# Patient Record
Sex: FEMALE | Race: WHITE | NOT HISPANIC OR LATINO | Employment: UNEMPLOYED | URBAN - METROPOLITAN AREA
[De-identification: names, ages, dates, MRNs, and addresses within clinical notes are randomized per-mention and may not be internally consistent; named-entity substitution may affect disease eponyms.]

---

## 2017-05-01 ENCOUNTER — ALLSCRIPTS OFFICE VISIT (OUTPATIENT)
Dept: OTHER | Facility: OTHER | Age: 67
End: 2017-05-01

## 2017-05-01 DIAGNOSIS — Z01.419 ENCOUNTER FOR GYNECOLOGICAL EXAMINATION WITHOUT ABNORMAL FINDING: ICD-10-CM

## 2017-05-04 LAB
HPV 18 (HISTORICAL): NOT DETECTED
HPV HIGH RISK 16/18 (HISTORICAL): NOT DETECTED
HPV16 (HISTORICAL): NOT DETECTED
PAP (HISTORICAL): NORMAL

## 2017-05-12 ENCOUNTER — GENERIC CONVERSION - ENCOUNTER (OUTPATIENT)
Dept: OTHER | Facility: OTHER | Age: 67
End: 2017-05-12

## 2018-01-12 ENCOUNTER — ALLSCRIPTS OFFICE VISIT (OUTPATIENT)
Dept: OTHER | Facility: OTHER | Age: 68
End: 2018-01-12

## 2018-01-12 LAB
BACTERIA UR QL AUTO: 0
CLUE CELL (HISTORICAL): 0
HYPHAL YEAST (HISTORICAL): 0
TRICHOMONAS (HISTORICAL): 0
YEAST (HISTORICAL): 0

## 2018-01-13 VITALS
SYSTOLIC BLOOD PRESSURE: 124 MMHG | DIASTOLIC BLOOD PRESSURE: 62 MMHG | BODY MASS INDEX: 21.51 KG/M2 | HEIGHT: 64 IN | WEIGHT: 126 LBS

## 2018-01-15 LAB
A. VAGINALIS BY PCR (HISTORICAL): NOT DETECTED
A.VAGINALIS LOG (CELL/ML) (HISTORICAL): <3.25
BVAB 2 (HISTORICAL): NOT DETECTED
C. ALBICANS, DNA OR PCR (HISTORICAL): NOT DETECTED
CANDIDA GENUS (HISTORICAL): NOT DETECTED
CULT RSLT GENITAL (HISTORICAL): ABNORMAL
GARDNERELLA BY MOL. METHOD (HISTORICAL): <3.25
GARDNERELLA BY MOL. METHOD (HISTORICAL): NOT DETECTED
LACTOBACILLUS (SPECIES) (HISTORICAL): <3.25
LACTOBACILLUS (SPECIES) (HISTORICAL): NOT DETECTED
MEGASPHAERA TYPE 1 (HISTORICAL): NOT DETECTED
TRICHOMONAS (HISTORICAL): NOT DETECTED

## 2018-01-23 VITALS
HEIGHT: 64 IN | DIASTOLIC BLOOD PRESSURE: 70 MMHG | SYSTOLIC BLOOD PRESSURE: 128 MMHG | BODY MASS INDEX: 21.34 KG/M2 | WEIGHT: 125 LBS

## 2018-02-01 ENCOUNTER — TELEPHONE (OUTPATIENT)
Dept: OBGYN CLINIC | Facility: CLINIC | Age: 68
End: 2018-02-01

## 2018-02-02 ENCOUNTER — TELEPHONE (OUTPATIENT)
Dept: OBGYN CLINIC | Facility: CLINIC | Age: 68
End: 2018-02-02

## 2018-02-02 NOTE — TELEPHONE ENCOUNTER
Per Dr Zita Holland, review that it may just be a normal discharge or may be working up a yeast infection  She would like her to try acidophilus or luvena probiotic  Also reviewed desitin if area is irritated from constantly wiping from the excess discharge  Will watch over the weekend and next few days and if no improvement is seen she should certainly give us a call back to possibly come in for another culture

## 2018-02-06 ENCOUNTER — TELEPHONE (OUTPATIENT)
Dept: OBGYN CLINIC | Facility: CLINIC | Age: 68
End: 2018-02-06

## 2018-02-06 NOTE — TELEPHONE ENCOUNTER
Recently treated patient for vaginal discharge which came back as E-Coli  Pt states discharge went away for about 4 days but has since returned  Spoke with patient about taking Acidophilous daily to help the vaginal mitchel and help prevent any vaginal infections  Reviewed [facility] as well to help

## 2018-07-02 ENCOUNTER — ANNUAL EXAM (OUTPATIENT)
Dept: OBGYN CLINIC | Facility: CLINIC | Age: 68
End: 2018-07-02
Payer: MEDICARE

## 2018-07-02 VITALS
BODY MASS INDEX: 20.14 KG/M2 | SYSTOLIC BLOOD PRESSURE: 116 MMHG | DIASTOLIC BLOOD PRESSURE: 66 MMHG | HEIGHT: 64 IN | WEIGHT: 118 LBS

## 2018-07-02 DIAGNOSIS — Z91.89 GYN EXAM FOR HIGH-RISK MEDICARE PATIENT: Primary | ICD-10-CM

## 2018-07-02 DIAGNOSIS — Z12.39 SCREENING FOR MALIGNANT NEOPLASM OF BREAST: ICD-10-CM

## 2018-07-02 RX ORDER — LEVOTHYROXINE SODIUM 0.07 MG/1
TABLET ORAL
COMMUNITY
Start: 2018-04-23 | End: 2021-09-15

## 2018-07-02 RX ORDER — MONTELUKAST SODIUM 10 MG/1
10 TABLET ORAL
COMMUNITY
Start: 2008-02-28

## 2018-07-02 RX ORDER — VENLAFAXINE HYDROCHLORIDE 37.5 MG/1
CAPSULE, EXTENDED RELEASE ORAL
COMMUNITY
Start: 2017-04-06

## 2018-07-02 RX ORDER — DIPHENOXYLATE HYDROCHLORIDE AND ATROPINE SULFATE 2.5; .025 MG/1; MG/1
1 TABLET ORAL
COMMUNITY

## 2018-07-02 NOTE — PROGRESS NOTES
Subjective      Marcos Olmstead is a 78 y o  female  who presents for annual well woman exam  Periods are absent  No bleeding, spotting, or discharge  Patient reports Yes  hot flashes/night sweats, No pain problems intercourse, Yes  vaginal dryness managed well with Premarin and lubrication, sleeping fairly well good nights and bad nights  Current contraception: post menopausal status  History of abnormal Pap smear: yes -4 years ago had abnormal Pap, has been followed  Family history of uterine or ovarian cancer: no  Regular self breast exam: yes  History of abnormal mammogram: no  Family history of breast cancer: yes - andbreast cancer: yes -    Menstrual History:  OB History      Para Term  AB Living    1       1      SAB TAB Ectopic Multiple Live Births                      Menarche age: 16  No LMP recorded (lmp unknown)  The following portions of the patient's history were reviewed and updated as appropriate: allergies, current medications, past family history, past medical history, past social history, past surgical history and problem list   Past Medical History:   Diagnosis Date    Arthritis     Asthma     Herpes genitalis     Hypothyroidism     IBS (irritable bowel syndrome)     Irritable bowel syndrome     Pneumonia      Past Surgical History:   Procedure Laterality Date    CERVICAL CONE BIOPSY      cervical conization by cold knife    CHOLECYSTECTOMY      OVARIAN CYST REMOVAL      SHOULDER SURGERY      TUBAL LIGATION       OB History      Para Term  AB Living    1       1      SAB TAB Ectopic Multiple Live Births                       Review of Systems  Review of Systems   Constitutional: Negative for chills, fatigue, fever and unexpected weight change  HENT: Negative for dental problem, sinus pain and sinus pressure  Eyes: Negative for visual disturbance  Respiratory: Negative for cough, shortness of breath and wheezing      Cardiovascular: Negative for chest pain and leg swelling  Gastrointestinal: Positive for diarrhea  Negative for constipation, nausea and vomiting  Genitourinary: Negative for menstrual problem, pelvic pain and urgency  Musculoskeletal: Negative for back pain  Allergic/Immunologic: Negative for environmental allergies  Neurological: Negative for dizziness and headaches  Objective      LMP  (LMP Unknown)   Vitals:    18 1145   BP: 116/66       General:   alert and oriented, in no acute distress, alert, appears stated age and cooperative   Heart: regular rate and rhythm, S1, S2 normal, no murmur, click, rub or gallop   Lungs: clear to auscultation bilaterally   Abdomen: soft, non-tender, without masses or organomegaly   Vulva: normal   Vagina: normal mucosa   Cervix: no bleeding following Pap, no cervical motion tenderness and no lesions   Uterus: normal size, mobile, non-tender   Adnexa: normal adnexa and no mass, fullness, tenderness   Breast inspection negative, no nipple discharge or bleeding, no masses or nodularity palpable  Rectal negative, stool guaiac negative  Pupils equal round reactive to light and accommodation  Throat clear no lesions or findings  Thyroid normal no masses or nodules            Assessment   81-year-old  here for annual exam had abnormal Pap 4 years ago  Follow-up has been good  Patient sees Dr Maisha Isidro for asthma  Plan   Thin prep with Co testing performed, consider if normal to begin to decrease Paps her regimen  Given slip for mammogram   Return in 1 year or sooner as needed

## 2018-07-06 LAB
HPV HR 12 DNA CVX QL NAA+PROBE: NOT DETECTED
HPV16 DNA SPEC QL NAA+PROBE: NOT DETECTED
HPV18 DNA SPEC QL NAA+PROBE: NOT DETECTED
THIN PREP CVX: NORMAL

## 2019-03-07 ENCOUNTER — TELEPHONE (OUTPATIENT)
Dept: OBGYN CLINIC | Facility: CLINIC | Age: 69
End: 2019-03-07

## 2019-03-07 DIAGNOSIS — B00.9 HERPES: Primary | ICD-10-CM

## 2019-03-07 RX ORDER — ACYCLOVIR 5 %
OINTMENT (GRAM) TOPICAL
Qty: 30 G | Refills: 0 | Status: SHIPPED | OUTPATIENT
Start: 2019-03-07 | End: 2022-04-27

## 2019-07-15 ENCOUNTER — ANNUAL EXAM (OUTPATIENT)
Dept: OBGYN CLINIC | Facility: CLINIC | Age: 69
End: 2019-07-15
Payer: MEDICARE

## 2019-07-15 VITALS
WEIGHT: 125 LBS | HEIGHT: 64 IN | DIASTOLIC BLOOD PRESSURE: 60 MMHG | BODY MASS INDEX: 21.34 KG/M2 | SYSTOLIC BLOOD PRESSURE: 110 MMHG

## 2019-07-15 DIAGNOSIS — Z12.39 SCREENING FOR MALIGNANT NEOPLASM OF BREAST: ICD-10-CM

## 2019-07-15 DIAGNOSIS — N95.2 ATROPHIC VAGINITIS: ICD-10-CM

## 2019-07-15 DIAGNOSIS — Z01.419 ENCOUNTER FOR GYNECOLOGICAL EXAMINATION WITHOUT ABNORMAL FINDING: Primary | ICD-10-CM

## 2019-07-15 PROCEDURE — G0101 CA SCREEN;PELVIC/BREAST EXAM: HCPCS | Performed by: OBSTETRICS & GYNECOLOGY

## 2019-07-15 NOTE — PROGRESS NOTES
Subjective      Melanie Araujo is a 80 y o   female who presents for annual well woman exam  Patient reports no bleeding, spotting, or discharge  Had possible yeast infection that she treated with probiotic and resolved  Patient reports Yes  hot flashes/night sweats, No pain problems intercourse, No vaginal dryness, sleeping fairly well,  sometimes could sometimes not as well  Some vaginal dryness noted discuss Premarin vaginal cream to continue to treat  Current contraception: post menopausal status  History of abnormal Pap smear: yes - cone biopsy in  with ROMULO 2  Family history of uterine or ovarian cancer: no  Regular self breast exam: yes  History of abnormal mammogram: no  Family history of breast cancer: yes     Menstrual History:  OB History        1    Para        Term                AB   1    Living           SAB        TAB        Ectopic        Multiple        Live Births                    Menarche age: 16    LMP 0    The following portions of the patient's history were reviewed and updated as appropriate: allergies, current medications, past family history, past medical history, past social history, past surgical history and problem list   Past Medical History:   Diagnosis Date    Arthritis     Asthma     Herpes genitalis     Hypothyroidism     IBS (irritable bowel syndrome)     Irritable bowel syndrome     Pneumonia      Past Surgical History:   Procedure Laterality Date    CERVICAL CONE BIOPSY      cervical conization by cold knife    CHOLECYSTECTOMY      OVARIAN CYST REMOVAL      SHOULDER SURGERY      TUBAL LIGATION       OB History        1    Para        Term                AB   1    Living           SAB        TAB        Ectopic        Multiple        Live Births                     Review of Systems  Review of Systems   Constitutional: Negative for chills, fatigue, fever and unexpected weight change     HENT: Negative for dental problem, sinus pressure and sinus pain  Eyes: Negative for visual disturbance  Respiratory: Positive for cough  Negative for shortness of breath and wheezing  Cardiovascular: Negative for chest pain and leg swelling  Gastrointestinal: Positive for diarrhea  Negative for constipation, nausea and vomiting  Endocrine: Positive for heat intolerance  Genitourinary: Negative for menstrual problem, pelvic pain and urgency  Musculoskeletal: Positive for arthralgias  Negative for back pain  Allergic/Immunologic: Positive for environmental allergies  Neurological: Positive for headaches  Negative for dizziness  Objective     Vitals:    07/15/19 1423   BP: 110/60   BP Location: Left arm   Patient Position: Sitting   Cuff Size: Standard   Weight: 56 7 kg (125 lb)   Height: 5' 4" (1 626 m)       General:   alert and oriented, in no acute distress   Heart: regular rate and rhythm, S1, S2 normal, no murmur, click, rub or gallop   Lungs: clear to auscultation bilaterally   Abdomen: soft, non-tender, without masses or organomegaly   Vulva: normal   Vagina: normal mucosa   Cervix: no cervical motion tenderness and no lesions   Uterus: normal size, mobile, non-tender   Adnexa: normal adnexa and no mass, fullness, tenderness   Breast inspection negative, no nipple discharge or bleeding, no masses or nodularity palpable  Rectal negative, stool guaiac negative  Pupils equal round reactive to light and accommodation  Throat clear no lesions or findings  Thyroid normal no masses or nodules     Assessment   60-year-old  here for annual exam had history of cold knife cone for cervical dysplasia in        Plan   Prescribed Premarin cream, thin prep with Co testing performed, return in 1 year or sooner as needed, mammogram slip given

## 2019-08-02 DIAGNOSIS — Z12.39 SCREENING FOR MALIGNANT NEOPLASM OF BREAST: ICD-10-CM

## 2020-11-17 ENCOUNTER — OFFICE VISIT (OUTPATIENT)
Dept: OBGYN CLINIC | Facility: CLINIC | Age: 70
End: 2020-11-17
Payer: MEDICARE

## 2020-11-17 VITALS
BODY MASS INDEX: 22.71 KG/M2 | HEIGHT: 64 IN | SYSTOLIC BLOOD PRESSURE: 142 MMHG | TEMPERATURE: 95.8 F | WEIGHT: 133 LBS | DIASTOLIC BLOOD PRESSURE: 76 MMHG

## 2020-11-17 DIAGNOSIS — B96.89 BACTERIAL VAGINOSIS: Primary | ICD-10-CM

## 2020-11-17 DIAGNOSIS — N76.0 BACTERIAL VAGINOSIS: Primary | ICD-10-CM

## 2020-11-17 DIAGNOSIS — N89.8 VAGINAL DISCHARGE: ICD-10-CM

## 2020-11-17 PROBLEM — N95.1 MENOPAUSAL VAGINAL DRYNESS: Status: ACTIVE | Noted: 2017-09-07

## 2020-11-17 PROBLEM — G25.81 RESTLESS LEGS SYNDROME (RLS): Status: ACTIVE | Noted: 2018-10-22

## 2020-11-17 PROBLEM — A49.8 E. COLI INFECTION: Status: ACTIVE | Noted: 2018-01-16

## 2020-11-17 PROBLEM — G43.719 INTRACTABLE CHRONIC MIGRAINE WITHOUT AURA AND WITHOUT STATUS MIGRAINOSUS: Status: ACTIVE | Noted: 2017-04-06

## 2020-11-17 PROBLEM — N95.9 POST MENOPAUSAL PROBLEMS: Status: ACTIVE | Noted: 2017-09-07

## 2020-11-17 LAB
BV WHIFF TEST VAG QL: ABNORMAL
CLUE CELLS SPEC QL WET PREP: ABNORMAL
PH SMN: ABNORMAL [PH]
SL AMB POCT WET MOUNT: ABNORMAL
T VAGINALIS VAG QL WET PREP: ABNORMAL
YEAST VAG QL WET PREP: ABNORMAL

## 2020-11-17 PROCEDURE — 99214 OFFICE O/P EST MOD 30 MIN: CPT | Performed by: PHYSICIAN ASSISTANT

## 2020-11-17 PROCEDURE — 87210 SMEAR WET MOUNT SALINE/INK: CPT | Performed by: PHYSICIAN ASSISTANT

## 2020-11-17 PROCEDURE — 87070 CULTURE OTHR SPECIMN AEROBIC: CPT | Performed by: PHYSICIAN ASSISTANT

## 2020-11-17 RX ORDER — LEVOTHYROXINE SODIUM 88 UG/1
TABLET ORAL
COMMUNITY
Start: 2020-09-21

## 2020-11-17 RX ORDER — METRONIDAZOLE 500 MG/1
500 TABLET ORAL EVERY 12 HOURS SCHEDULED
Qty: 14 TABLET | Refills: 0 | Status: SHIPPED | OUTPATIENT
Start: 2020-11-17 | End: 2020-11-24

## 2020-11-21 LAB — BACTERIA GENITAL AEROBE CULT: NORMAL

## 2020-11-25 ENCOUNTER — TELEPHONE (OUTPATIENT)
Dept: OBGYN CLINIC | Facility: CLINIC | Age: 70
End: 2020-11-25

## 2020-11-25 DIAGNOSIS — B96.89 BACTERIAL VAGINOSIS: Primary | ICD-10-CM

## 2020-11-25 DIAGNOSIS — N76.0 BACTERIAL VAGINOSIS: Primary | ICD-10-CM

## 2020-11-25 RX ORDER — CLINDAMYCIN PHOSPHATE 20 MG/G
1 CREAM VAGINAL
Qty: 40 G | Refills: 0 | Status: SHIPPED | OUTPATIENT
Start: 2020-11-25 | End: 2020-11-28

## 2020-12-07 ENCOUNTER — TELEPHONE (OUTPATIENT)
Dept: OBGYN CLINIC | Facility: CLINIC | Age: 70
End: 2020-12-07

## 2020-12-08 ENCOUNTER — OFFICE VISIT (OUTPATIENT)
Dept: OBGYN CLINIC | Facility: CLINIC | Age: 70
End: 2020-12-08
Payer: MEDICARE

## 2020-12-08 VITALS
BODY MASS INDEX: 22.71 KG/M2 | WEIGHT: 133 LBS | SYSTOLIC BLOOD PRESSURE: 168 MMHG | DIASTOLIC BLOOD PRESSURE: 80 MMHG | HEIGHT: 64 IN

## 2020-12-08 DIAGNOSIS — N89.8 VAGINAL DISCHARGE: Primary | ICD-10-CM

## 2020-12-08 PROBLEM — Z87.01 HISTORY OF PNEUMONIA: Status: ACTIVE | Noted: 2020-12-08

## 2020-12-08 PROBLEM — R05.9 COUGH: Status: ACTIVE | Noted: 2020-12-08

## 2020-12-08 PROBLEM — J45.20 MILD INTERMITTENT ASTHMA: Status: ACTIVE | Noted: 2020-12-08

## 2020-12-08 PROBLEM — J30.81 ALLERGIC RHINITIS DUE TO ANIMAL HAIR AND DANDER: Status: ACTIVE | Noted: 2020-12-08

## 2020-12-08 PROBLEM — J30.9 ALLERGIC RHINITIS: Status: ACTIVE | Noted: 2020-12-08

## 2020-12-08 PROBLEM — J30.1 ALLERGIC RHINITIS DUE TO POLLEN: Status: ACTIVE | Noted: 2020-12-08

## 2020-12-08 LAB
BV WHIFF TEST VAG QL: ABNORMAL
CLUE CELLS SPEC QL WET PREP: ABNORMAL
PH SMN: 7 [PH]
SL AMB POCT WET MOUNT: ABNORMAL
T VAGINALIS VAG QL WET PREP: ABNORMAL
YEAST VAG QL WET PREP: ABNORMAL

## 2020-12-08 PROCEDURE — 87070 CULTURE OTHR SPECIMN AEROBIC: CPT | Performed by: PHYSICIAN ASSISTANT

## 2020-12-08 PROCEDURE — 87210 SMEAR WET MOUNT SALINE/INK: CPT | Performed by: PHYSICIAN ASSISTANT

## 2020-12-08 PROCEDURE — 99213 OFFICE O/P EST LOW 20 MIN: CPT | Performed by: PHYSICIAN ASSISTANT

## 2020-12-08 RX ORDER — MONTELUKAST SODIUM 10 MG/1
TABLET ORAL EVERY 24 HOURS
COMMUNITY
End: 2021-09-15 | Stop reason: SDUPTHER

## 2020-12-11 LAB — BACTERIA GENITAL AEROBE CULT: NORMAL

## 2021-02-13 DIAGNOSIS — Z23 ENCOUNTER FOR IMMUNIZATION: ICD-10-CM

## 2021-02-19 ENCOUNTER — IMMUNIZATIONS (OUTPATIENT)
Dept: FAMILY MEDICINE CLINIC | Facility: HOSPITAL | Age: 71
End: 2021-02-19

## 2021-02-19 DIAGNOSIS — Z23 ENCOUNTER FOR IMMUNIZATION: Primary | ICD-10-CM

## 2021-02-19 PROCEDURE — 91300 SARS-COV-2 / COVID-19 MRNA VACCINE (PFIZER-BIONTECH) 30 MCG: CPT

## 2021-02-19 PROCEDURE — 0001A SARS-COV-2 / COVID-19 MRNA VACCINE (PFIZER-BIONTECH) 30 MCG: CPT

## 2021-03-11 ENCOUNTER — IMMUNIZATIONS (OUTPATIENT)
Dept: FAMILY MEDICINE CLINIC | Facility: HOSPITAL | Age: 71
End: 2021-03-11

## 2021-03-11 DIAGNOSIS — Z23 ENCOUNTER FOR IMMUNIZATION: Primary | ICD-10-CM

## 2021-03-11 PROCEDURE — 0002A SARS-COV-2 / COVID-19 MRNA VACCINE (PFIZER-BIONTECH) 30 MCG: CPT

## 2021-03-11 PROCEDURE — 91300 SARS-COV-2 / COVID-19 MRNA VACCINE (PFIZER-BIONTECH) 30 MCG: CPT

## 2021-03-28 DIAGNOSIS — N95.2 ATROPHIC VAGINITIS: ICD-10-CM

## 2021-04-05 ENCOUNTER — TELEPHONE (OUTPATIENT)
Dept: OBGYN CLINIC | Facility: CLINIC | Age: 71
End: 2021-04-05

## 2021-04-05 NOTE — TELEPHONE ENCOUNTER
Could not complete [employer] PA on CoverMyMeds, [address]  Reference #[id number]  Non-formulary medication  Formulary medication - Estradoil  0 5mg, 1mg or 2mg

## 2021-04-05 NOTE — TELEPHONE ENCOUNTER
CoverMyMeds states pt does not have pharmacy benefits with her [employer]  Called pt, pt states she has pharmacy benefits through [place] [address] ID# [id number], [facility] [id number] and [phone]

## 2021-04-05 NOTE — TELEPHONE ENCOUNTER
Pt requesting us contact her [address] regarding the Premarin cream  Pt states Konstantin told her they reached out to us to discuss the prescription  Pt aware will contact pharmacy and let her know the result

## 2021-04-06 NOTE — TELEPHONE ENCOUNTER
Roberto requesting more information on the Premarin Cream  May call : [phone]  Could you please review and let me know what other indications pt may have for medication? Looks like pt has been on medication since 2018  Was there any failed medications prior? Premarin Cream is non formulary but Yan Mays is on pt formulary for approved medication  Please review

## 2021-04-06 NOTE — TELEPHONE ENCOUNTER
Reviewed with pt  Pt would like us to reach back out to insurance to review the information  Pt aware will contact insurance and keep her updated

## 2021-04-06 NOTE — TELEPHONE ENCOUNTER
Submitted [place] to be [place] [facility]  Requesting pt continue medication as she has been on since 2018  Pt does not wish to change Rx when current medication is working for pt  Will be notified by fax regarding results of decision

## 2021-04-06 NOTE — TELEPHONE ENCOUNTER
Is estrace cream covered, from what I understand she is using for vaginal dryness  I do not want to put her on oral HRT

## 2021-04-08 NOTE — TELEPHONE ENCOUNTER
Reviewed with pt  Pt states she will contact [employer] to discuss with them  She will also look into OOP

## 2021-04-08 NOTE — TELEPHONE ENCOUNTER
Denial letter for Premarin Cream in pts media  Went to [employer] to see other alternatives offered on formulary - https://drug-list-search  apps  [employer]ch[employer]/medicare/check-coverage  Only medication on formulary is Estradiol  Denial letter does state "estradiol vaginal cream, estring vaginal ring or osphena tablet"  Will need to speak to pt and see how she would like to proceed

## 2021-09-15 ENCOUNTER — ANNUAL EXAM (OUTPATIENT)
Dept: OBGYN CLINIC | Facility: CLINIC | Age: 71
End: 2021-09-15
Payer: MEDICARE

## 2021-09-15 ENCOUNTER — TELEPHONE (OUTPATIENT)
Dept: ADMINISTRATIVE | Facility: OTHER | Age: 71
End: 2021-09-15

## 2021-09-15 VITALS
SYSTOLIC BLOOD PRESSURE: 112 MMHG | HEIGHT: 64 IN | DIASTOLIC BLOOD PRESSURE: 66 MMHG | BODY MASS INDEX: 22.4 KG/M2 | WEIGHT: 131.2 LBS

## 2021-09-15 DIAGNOSIS — N87.1 DYSPLASIA OF CERVIX, HIGH GRADE CIN 2: ICD-10-CM

## 2021-09-15 DIAGNOSIS — Z12.31 ENCOUNTER FOR SCREENING MAMMOGRAM FOR BREAST CANCER: ICD-10-CM

## 2021-09-15 DIAGNOSIS — N95.2 ATROPHIC VAGINITIS: ICD-10-CM

## 2021-09-15 DIAGNOSIS — Z01.419 ROUTINE GYNECOLOGICAL EXAMINATION: Primary | ICD-10-CM

## 2021-09-15 PROCEDURE — G0145 SCR C/V CYTO,THINLAYER,RESCR: HCPCS | Performed by: PHYSICIAN ASSISTANT

## 2021-09-15 PROCEDURE — G0101 CA SCREEN;PELVIC/BREAST EXAM: HCPCS | Performed by: PHYSICIAN ASSISTANT

## 2021-09-15 NOTE — TELEPHONE ENCOUNTER
----- Message from Gely Parker PA-C sent at 9/15/2021 10:32 AM EDT -----  09/15/21 10:32 AM     Hello, our patient Rosamaria Sin had a Colonoscopy  completed/performed  Please assist in updating the patient chart by making an External outreach to [facility]        Thank you,   Gely Parker PA-C   [address] [place] [address]

## 2021-09-15 NOTE — TELEPHONE ENCOUNTER
Upon review of the In Basket request and the patient's chart, initial outreach has been made via fax, please see Contacts section for details       Thank you  Twila Mccall MA

## 2021-09-15 NOTE — ASSESSMENT & PLAN NOTE
Pap guidelines reviewed  Pap with reflex done today  Will plan to continue Premarin cream, script refilled  Continue yearly mammograms  Return to office for annual or as needed

## 2021-09-15 NOTE — LETTER
[facility] Medical Records Request for Care Gap Closure    Medical Records Fax: [phone]    Date Requested: 09/15/21  Patient: Elizabeth Worthy  Patient : 1950   Requesting on behalf of Provider: MD Blanco Bhakta MD has requested the retrieval and updating of CRC: Colonoscopy  The office staff has provided us with the following information listed below  Prior to sending this request I have reviewed Epic Chart Review, completed an Epic Chart Search, and reviewed Care Everywhere documents  Estimated Date of Service: ***  Facility: ***  Specialty: ***  Performing Provider: ***  Additional Comments: ***     Your assistance in completing this request is greatly appreciated  Please send document to {VBIRIGHTFAXNUMBERS:73637} attention {VBIQANAMES:02668}       Sincerely,      Rufina Redman, [address] [place] [address] Tamie Barrett

## 2021-09-15 NOTE — PROGRESS NOTES
Assessment/Plan   Problem List Items Addressed This Visit        Other    Routine gynecological examination - Primary     Pap guidelines reviewed  Pap with reflex done today  Will plan to continue Premarin cream, script refilled  Continue yearly mammograms  Return to office for annual or as needed  Relevant Orders    Liquid-based pap, screening      Other Visit Diagnoses     Dysplasia of cervix, high grade ROMULO 2        Relevant Orders    Liquid-based pap, screening    Encounter for screening mammogram for breast cancer        Relevant Orders    Mammo screening bilateral w 3d & cad    Atrophic vaginitis        Relevant Medications    conjugated estrogens (PREMARIN) vaginal cream          Subjective:     Patient ID: Hamilton Schaeffer is a 80 y o  y o  female  HPI  81 yo seen for annual exam  LMP: 1997 No bleeding since  Denies bowel or bladder issues  Using Premarin cream for vaginal dryness, has been helping  Typically uses twice a week  Last pap: 7/15/2019 NILM (-)HRHPV  Cone biopsy in 2014 secondary to ROMULO 2  Last mammogram: 7/31/2019 reports normal    Last colonoscopy: 10/2020, reports due in 5 years  The following portions of the patient's history were reviewed and updated as appropriate:   She  has a past medical history of Abnormal Pap smear of cervix (02/17/2014), Arthritis, Asthma, Herpes genitalis, Hypothyroidism, IBS (irritable bowel syndrome), Irritable bowel syndrome, Migraine, and Pneumonia    She   Patient Active Problem List    Diagnosis Date Noted    Routine gynecological examination 09/15/2021    Allergic rhinitis 12/08/2020    Allergic rhinitis due to animal hair and dander 12/08/2020    Allergic rhinitis due to pollen 12/08/2020    Cough 12/08/2020    History of pneumonia 12/08/2020    Mild intermittent asthma 12/08/2020    Bacterial vaginosis 11/17/2020    Restless legs syndrome (RLS) 10/22/2018    E  coli infection 01/16/2018    Vaginal discharge 01/12/2018    Vaginal itching 2018    Menopausal vaginal dryness 2017    Post menopausal problems 2017    Intractable chronic migraine without aura and without status migrainosus 2017    Gastroesophageal reflux disease without esophagitis 2016    Irritable bowel syndrome with diarrhea 2016    Mild persistent asthma without complication     Other specified hypothyroidism 2016    Senile osteoporosis 2016    Asthma 2016    Neck pain 2008     She  has a past surgical history that includes Cervical cone biopsy; Cholecystectomy; Ovarian cyst removal; Shoulder surgery; Tubal ligation; and pr conization cervix,knife/laser (2014)  Her family history includes Breast cancer in her other; No Known Problems in her father  She  reports that she quit smoking about 41 years ago  Her smoking use included cigarettes  She has a 5 00 pack-year smoking history  She has never used smokeless tobacco  She reports current alcohol use  She reports that she does not use drugs  Current Outpatient Medications   Medication Sig Dispense Refill    conjugated estrogens (PREMARIN) vaginal cream Insert 0 5 g into the vagina once a week 30 g 1    levothyroxine 88 mcg tablet       montelukast (SINGULAIR) 10 mg tablet Take 10 mg by mouth      multivitamin (THERAGRAN) TABS Take 1 tablet by mouth      venlafaxine (EFFEXOR-XR) 37 5 mg 24 hr capsule Take by mouth      ZOVIRAX 5 % ointment Apply topically 6 (six) times a day for 7 days 30 g 0     No current facility-administered medications for this visit  She is allergic to dust mite extract, other, and topiramate       Menstrual History:  OB History        1    Para        Term                AB   1    Living           SAB        TAB        Ectopic        Multiple        Live Births                      No LMP recorded (lmp unknown)   Patient is postmenopausal          Review of Systems   Constitutional: Negative for fatigue, fever and unexpected weight change  HENT: Negative for dental problem and sinus pressure  Eyes: Negative for visual disturbance  Respiratory: Negative for cough, shortness of breath and wheezing  Cardiovascular: Negative for chest pain  Gastrointestinal: Negative for abdominal pain, blood in stool, constipation, diarrhea, nausea and vomiting  Endocrine: Negative for polydipsia  Genitourinary: Negative for difficulty urinating, dyspareunia, dysuria, frequency, hematuria, pelvic pain and urgency  Musculoskeletal: Negative for arthralgias and back pain  Neurological: Negative for dizziness, seizures, light-headedness and headaches  Psychiatric/Behavioral: Negative for suicidal ideas  The patient is not nervous/anxious  Objective:  Vitals:    09/15/21 1019   BP: 112/66   BP Location: Left arm   Patient Position: Sitting   Cuff Size: Standard   Weight: 59 5 kg (131 lb 3 2 oz)   Height: 5' 4" (1 626 m)      Physical Exam  Constitutional:       Appearance: Normal appearance  She is well-developed  Genitourinary:      Vulva, urethra, bladder, vagina and uterus normal       No vulval condylomata, lesion, tenderness, ulcerations, Bartholin's cyst or rash noted  No signs of labial injury  No labial fusion  No inguinal adenopathy present in the right or left side  No urethral prolapse, pain, swelling, tenderness, caruncle, mass or diverticulum present  Bladder is not distended or tender  No signs of injury or lesions in the vagina  No vaginal discharge, erythema, tenderness or bleeding  No cervical motion tenderness, discharge or lesion  Uterus is not enlarged, tender, irregular or mobile  No uterine mass detected  No right or left adnexal mass present  Right adnexa not tender or full  Left adnexa not tender or full  HENT:      Head: Normocephalic and atraumatic  Neck:      Thyroid: No thyromegaly  Cardiovascular:      Rate and Rhythm: Normal rate and regular rhythm  Heart sounds: Normal heart sounds  No murmur heard  No friction rub  No gallop  Pulmonary:      Effort: Pulmonary effort is normal  No respiratory distress  Breath sounds: Normal breath sounds  No wheezing  Chest:      Breasts: Breasts are symmetrical          Right: Normal  No swelling, bleeding, inverted nipple, mass, nipple discharge, skin change or tenderness  Left: Normal  No swelling, bleeding, inverted nipple, mass, nipple discharge, skin change or tenderness  Abdominal:      General: There is no distension  Palpations: Abdomen is soft  There is no mass  Tenderness: There is no abdominal tenderness  There is no guarding or rebound  Hernia: No hernia is present  Lymphadenopathy:      Cervical: No cervical adenopathy  Upper Body:      Right upper body: No supraclavicular, axillary or pectoral adenopathy  Left upper body: No supraclavicular, axillary or pectoral adenopathy  Lower Body: No right inguinal adenopathy  No left inguinal adenopathy  Neurological:      Mental Status: She is alert and oriented to person, place, and time  Skin:     General: Skin is warm and dry     Psychiatric:         Behavior: Behavior normal

## 2021-09-16 NOTE — TELEPHONE ENCOUNTER
Upon review of the In Basket request we were able to locate, review, and update the patient chart as requested for CRC: Colonoscopy  Any additional questions or concerns should be emailed to the Practice Liaisons via [email or web address] email, please do not reply via In Basket      Thank you  Ana Laura Cespedes MA

## 2021-09-22 LAB
LAB AP GYN PRIMARY INTERPRETATION: NORMAL
LAB AP LMP: NORMAL
Lab: NORMAL

## 2022-03-14 ENCOUNTER — TELEPHONE (OUTPATIENT)
Dept: OBGYN CLINIC | Facility: CLINIC | Age: 72
End: 2022-03-14

## 2022-03-16 ENCOUNTER — OFFICE VISIT (OUTPATIENT)
Dept: OBGYN CLINIC | Facility: CLINIC | Age: 72
End: 2022-03-16
Payer: MEDICARE

## 2022-03-16 VITALS
WEIGHT: 131 LBS | BODY MASS INDEX: 22.36 KG/M2 | HEIGHT: 64 IN | SYSTOLIC BLOOD PRESSURE: 118 MMHG | DIASTOLIC BLOOD PRESSURE: 72 MMHG

## 2022-03-16 DIAGNOSIS — N89.8 VAGINAL DISCHARGE: Primary | ICD-10-CM

## 2022-03-16 PROBLEM — A49.8 E. COLI INFECTION: Status: RESOLVED | Noted: 2018-01-16 | Resolved: 2022-03-16

## 2022-03-16 LAB
BV WHIFF TEST VAG QL: NORMAL
CLUE CELLS SPEC QL WET PREP: NORMAL
PH SMN: 5 [PH]
SL AMB POCT WET MOUNT: NORMAL
T VAGINALIS VAG QL WET PREP: NORMAL
YEAST VAG QL WET PREP: NORMAL

## 2022-03-16 PROCEDURE — 87210 SMEAR WET MOUNT SALINE/INK: CPT | Performed by: PHYSICIAN ASSISTANT

## 2022-03-16 PROCEDURE — 87077 CULTURE AEROBIC IDENTIFY: CPT | Performed by: PHYSICIAN ASSISTANT

## 2022-03-16 PROCEDURE — 99213 OFFICE O/P EST LOW 20 MIN: CPT | Performed by: PHYSICIAN ASSISTANT

## 2022-03-16 PROCEDURE — 87186 SC STD MICRODIL/AGAR DIL: CPT | Performed by: PHYSICIAN ASSISTANT

## 2022-03-16 PROCEDURE — 87070 CULTURE OTHR SPECIMN AEROBIC: CPT | Performed by: PHYSICIAN ASSISTANT

## 2022-03-16 NOTE — PROGRESS NOTES
Assessment/Plan:    Vaginal discharge  Reviewed negative wet mount on today's exam  Genital culture done today  Office will call with results and appropriate follow up  May be secondary to vaginal dryness  Reviewed coconut oil and Key E suppositories  Return to office for annual or as needed  Problem List Items Addressed This Visit        Other    Vaginal discharge - Primary     Reviewed negative wet mount on today's exam  Genital culture done today  Office will call with results and appropriate follow up  May be secondary to vaginal dryness  Reviewed coconut oil and Key E suppositories  Return to office for annual or as needed  Relevant Orders    POCT wet mount (Completed)            Subjective:      Patient ID: Srege Olmedo is a 80 y o  female  HPI  79 yo seen for vaginal discharge, itching and burning  Reports yellow discharge  Due to expense was using Premarin every 5th day instead of every 3-4 days  Denies bowel or bladder issues  Denies pelvic pain, odor, fever or chils  The following portions of the patient's history were reviewed and updated as appropriate:   She  has a past medical history of Abnormal Pap smear of cervix (02/17/2014), Arthritis, Asthma, Herpes genitalis, Hypothyroidism, IBS (irritable bowel syndrome), Irritable bowel syndrome, Migraine, and Pneumonia    She   Patient Active Problem List    Diagnosis Date Noted    Routine gynecological examination 09/15/2021    Allergic rhinitis 12/08/2020    Allergic rhinitis due to animal hair and dander 12/08/2020    Allergic rhinitis due to pollen 12/08/2020    Cough 12/08/2020    History of pneumonia 12/08/2020    Mild intermittent asthma 12/08/2020    Bacterial vaginosis 11/17/2020    Restless legs syndrome (RLS) 10/22/2018    Vaginal discharge 01/12/2018    Vaginal itching 01/12/2018    Menopausal vaginal dryness 09/07/2017    Post menopausal problems 09/07/2017    Intractable chronic migraine without aura and without status migrainosus 04/06/2017    Gastroesophageal reflux disease without esophagitis 08/02/2016    Irritable bowel syndrome with diarrhea 08/02/2016    Mild persistent asthma without complication 47/79/8872    Other specified hypothyroidism 08/02/2016    Senile osteoporosis 08/02/2016    Asthma 04/25/2016    Neck pain 09/04/2008     She  has a past surgical history that includes Cervical cone biopsy; Cholecystectomy; Ovarian cyst removal; Shoulder surgery; Tubal ligation; and pr conization cervix,knife/laser (03/27/2014)  Her family history includes Breast cancer in her other; No Known Problems in her father  She  reports that she quit smoking about 42 years ago  Her smoking use included cigarettes  She has a 5 00 pack-year smoking history  She has never used smokeless tobacco  She reports current alcohol use  She reports that she does not use drugs  Current Outpatient Medications   Medication Sig Dispense Refill    conjugated estrogens (PREMARIN) vaginal cream Insert 0 5 g into the vagina once a week 30 g 1    levothyroxine 88 mcg tablet       montelukast (SINGULAIR) 10 mg tablet Take 10 mg by mouth      multivitamin (THERAGRAN) TABS Take 1 tablet by mouth      venlafaxine (EFFEXOR-XR) 37 5 mg 24 hr capsule Take by mouth      ZOVIRAX 5 % ointment Apply topically 6 (six) times a day for 7 days 30 g 0     No current facility-administered medications for this visit  She is allergic to dust mite extract, other, and topiramate       Review of Systems   Constitutional: Negative for fatigue, fever and unexpected weight change  HENT: Negative for dental problem and sinus pressure  Eyes: Negative for visual disturbance  Respiratory: Negative for cough, shortness of breath and wheezing  Cardiovascular: Negative for chest pain  Gastrointestinal: Negative for abdominal pain, blood in stool, constipation, diarrhea, nausea and vomiting  Endocrine: Negative for polydipsia  Genitourinary: Positive for vaginal discharge  Negative for difficulty urinating, dyspareunia, dysuria, frequency, hematuria, pelvic pain and urgency  Musculoskeletal: Negative for arthralgias and back pain  Neurological: Negative for dizziness, seizures, light-headedness and headaches  Psychiatric/Behavioral: Negative for suicidal ideas  The patient is not nervous/anxious  Objective:      /72 (BP Location: Left arm, Patient Position: Sitting, Cuff Size: Standard)   Ht 5' 4" (1 626 m)   Wt 59 4 kg (131 lb)   LMP  (LMP Unknown)   BMI 22 49 kg/m²          Physical Exam  Constitutional:       Appearance: Normal appearance  She is well-developed  Neck:      Thyroid: No thyroid mass or thyromegaly  Cardiovascular:      Rate and Rhythm: Normal rate and regular rhythm  Heart sounds: Normal heart sounds  No murmur heard  No friction rub  No gallop  Pulmonary:      Effort: Pulmonary effort is normal  No respiratory distress  Breath sounds: Normal breath sounds  No wheezing or rales  Chest:   Breasts:      Right: No supraclavicular adenopathy  Left: No supraclavicular adenopathy  Abdominal:      General: Bowel sounds are normal  There is no distension  Palpations: Abdomen is soft  There is no mass  Tenderness: There is no abdominal tenderness  There is no guarding or rebound  Genitourinary:     Labia:         Right: No rash, tenderness, lesion or injury  Left: No rash, tenderness, lesion or injury  Urethra: No prolapse, urethral pain, urethral swelling or urethral lesion  Vagina: No signs of injury  Vaginal discharge (small amount of thin white discharge, reports last used Premarin Monday night (2 nights ago)) present  No erythema, tenderness or bleeding  Cervix: No cervical motion tenderness, discharge or friability  Uterus: Not deviated, not enlarged and not tender  Adnexa:         Right: No mass, tenderness or fullness  Left: No mass, tenderness or fullness  Musculoskeletal:      Cervical back: Neck supple  Lymphadenopathy:      Cervical: No cervical adenopathy  Upper Body:      Right upper body: No supraclavicular adenopathy  Left upper body: No supraclavicular adenopathy  Skin:     General: Skin is warm and dry  Coloration: Skin is not pale  Findings: No erythema or rash  Neurological:      Mental Status: She is alert and oriented to person, place, and time  Psychiatric:         Behavior: Behavior normal          Thought Content:  Thought content normal          Judgment: Judgment normal          Wet mount: No trichomonads, clue cells, or budding yeast

## 2022-03-16 NOTE — ASSESSMENT & PLAN NOTE
Reviewed negative wet mount on today's exam  Genital culture done today  Office will call with results and appropriate follow up  May be secondary to vaginal dryness  Reviewed coconut oil and Key E suppositories  Return to office for annual or as needed

## 2022-03-20 LAB
BACTERIA GENITAL AEROBE CULT: ABNORMAL
BACTERIA GENITAL AEROBE CULT: ABNORMAL

## 2022-03-22 DIAGNOSIS — N76.0 ACUTE VAGINITIS: Primary | ICD-10-CM

## 2022-03-22 RX ORDER — SULFAMETHOXAZOLE AND TRIMETHOPRIM 800; 160 MG/1; MG/1
1 TABLET ORAL EVERY 12 HOURS SCHEDULED
Qty: 10 TABLET | Refills: 0 | Status: SHIPPED | OUTPATIENT
Start: 2022-03-22 | End: 2022-03-27

## 2022-04-11 ENCOUNTER — TELEPHONE (OUTPATIENT)
Dept: OBGYN CLINIC | Facility: CLINIC | Age: 72
End: 2022-04-11

## 2022-04-11 NOTE — TELEPHONE ENCOUNTER
Patient states having discharge  Stopped while taking abx but when finished it returned  Patient is having itching  Patient states discharge is clear to yellow and no odor  Patient states is the same as prior  Aware will review with Jennifer for her  Aware that she if off and will review and call her back tomorrow       (Patient states will be home until noon tomorrow and if needs any prescription it needs to be a tablet)

## 2022-04-11 NOTE — TELEPHONE ENCOUNTER
Was being treated by Luis E Rodriguez for ecoli infection  Once finished abx it returned  Would like to know what her next steps are

## 2022-04-12 NOTE — TELEPHONE ENCOUNTER
Vaginal dryness and irritation can cause a thin yellow discharge and itching  The [facility] should have cleared with the Bactrim  I would recommend considering appointment to reculture, if she cannot get in we can try yeast medication such as Monistat or Diflucan as antibiotics may have led to yeast infection

## 2022-04-21 ENCOUNTER — TELEPHONE (OUTPATIENT)
Dept: OBGYN CLINIC | Facility: CLINIC | Age: 72
End: 2022-04-21

## 2022-04-22 NOTE — TELEPHONE ENCOUNTER
Spoke with patient, seen mid march and was treated with abx  For 3 days while taking abx it resolved  Then returned shortly after stopping abx  States discharge is a pale yellow color, wet not thick and itchy  No odor that she is detecting  She tried monistat without resolve  Aware will review with Jennifer to see if needs re-treatment for BV or what she would like her to do for next steps  Pt uses [facility] on  [address]

## 2022-04-22 NOTE — TELEPHONE ENCOUNTER
As reviewed in previous phone tasks vaginal dryness can cause yellow discharge  I would recommend the Key E suppositories and coconut oil as previously discussed  The E coli should have cleared with the Bactrim she was given previously  If she desires reculture she will need an appointment

## 2022-04-22 NOTE — TELEPHONE ENCOUNTER
Patient states has all those things at home and its not helping   Offered and scheduled apt for re-culture Normal rate, regular rhythm, normal S1, S2 heart sounds heard.

## 2022-04-27 ENCOUNTER — OFFICE VISIT (OUTPATIENT)
Dept: OBGYN CLINIC | Facility: CLINIC | Age: 72
End: 2022-04-27
Payer: MEDICARE

## 2022-04-27 VITALS
SYSTOLIC BLOOD PRESSURE: 116 MMHG | HEIGHT: 64 IN | BODY MASS INDEX: 22.57 KG/M2 | WEIGHT: 132.2 LBS | DIASTOLIC BLOOD PRESSURE: 68 MMHG

## 2022-04-27 DIAGNOSIS — N95.1 MENOPAUSAL VAGINAL DRYNESS: ICD-10-CM

## 2022-04-27 DIAGNOSIS — N89.8 VAGINAL DISCHARGE: Primary | ICD-10-CM

## 2022-04-27 DIAGNOSIS — L29.2 VULVAR ITCHING: ICD-10-CM

## 2022-04-27 LAB
BV WHIFF TEST VAG QL: NORMAL
CLUE CELLS SPEC QL WET PREP: NORMAL
PH SMN: 4.5 [PH]
SL AMB POCT WET MOUNT: NORMAL
T VAGINALIS VAG QL WET PREP: NORMAL
YEAST VAG QL WET PREP: NORMAL

## 2022-04-27 PROCEDURE — 87077 CULTURE AEROBIC IDENTIFY: CPT | Performed by: PHYSICIAN ASSISTANT

## 2022-04-27 PROCEDURE — 87210 SMEAR WET MOUNT SALINE/INK: CPT | Performed by: PHYSICIAN ASSISTANT

## 2022-04-27 PROCEDURE — 87186 SC STD MICRODIL/AGAR DIL: CPT | Performed by: PHYSICIAN ASSISTANT

## 2022-04-27 PROCEDURE — 99214 OFFICE O/P EST MOD 30 MIN: CPT | Performed by: PHYSICIAN ASSISTANT

## 2022-04-27 PROCEDURE — 87070 CULTURE OTHR SPECIMN AEROBIC: CPT | Performed by: PHYSICIAN ASSISTANT

## 2022-04-27 NOTE — PROGRESS NOTES
Assessment/Plan   Diagnoses and all orders for this visit:    Vaginal discharge  -     Genital Comprehensive Culture  -     POCT wet mount    Vulvar itching  -     Genital Comprehensive Culture  -     POCT wet mount    Menopausal vaginal dryness    Discussion  Reassured patient with unremarkable physical exam and wet mount today  Genital culture obtained - will treat based on results  Encourage probiotic tablet like acidophilus BID with sx and then qd for maintenance; can also increase yogurt in diet; Apply OTC hydrocortisone BID x 7-10 days for vulvar itching  Can consider vaginal pH balance products and some suggestions listed in her AVS  Reviewed her management of vaginal dryness/atrophy - ok to continue premarin vaginal cream q 4 days and on the off days can consider coconut oil or any of the vaginal moisturizers/pH balancers  All questions answered at this time  Patient to call with any further questions/concerns  RTO for APE when due or sooner if needed    Subjective     HPI   Adelina Kyle is a 80 y o  female who presents for possible infection  Saw Umesh Cruz on 3/16/22 - wet mount negative and genital culture (+) E  Coli and treated with Bactrim BID x 5 days; She uses premarin vaginal cream q 4 days instead of 2-3x/week due to expense  She was also encouraged to also use coconut oil and/or KeyE suppositories - has been trying the [facility] [place] suppositories  (+) some vulvar itch that comes and goes; No vaginal itch/burn; (+) abn discharge - light yellow with no odor - resolved while on the antibiotic but then returned after completed the antibiotic so wants to be re-tested for infection; She called in and was advised to trial OTC monistat which did not resolve symptoms; No urinary sx - burning/pain/frequency/hematuria  No abd/pelvic pain; No fever/chills  LMP - 1997; Denies  bleeding;     Pt is sexually active in a mutually monog/ sexual relationship; Declines sti cultures; Declines hiv/hep testing; Feels safe at home    Last Pap - 9/15/21 - WNL; 7/15/19 - WNL (-) ECC atrophy (-) HRHPV type 16/18 neg  History of abnormal Pap smear: yes s/p cone biopsy 2014 ROMULO 2    Review of Systems   Constitutional: Negative for activity change, fatigue, fever and unexpected weight change  HENT: Negative for congestion, dental problem, sinus pressure and sinus pain  Eyes: Negative for visual disturbance  Respiratory: Negative for cough, shortness of breath and wheezing  Cardiovascular: Negative for chest pain and leg swelling  Gastrointestinal: Negative for abdominal distention, abdominal pain, blood in stool, constipation, diarrhea, nausea and vomiting  Endocrine: Negative for polydipsia  Genitourinary: Positive for vaginal discharge  Negative for difficulty urinating, dyspareunia, dysuria, frequency, hematuria, menstrual problem, pelvic pain, urgency, vaginal bleeding and vaginal pain  Musculoskeletal: Negative for arthralgias and back pain  Allergic/Immunologic: Negative for environmental allergies  Neurological: Negative for dizziness, seizures and headaches  Psychiatric/Behavioral: Negative for dysphoric mood and sleep disturbance  The patient is not nervous/anxious          The following portions of the patient's history were reviewed and updated as appropriate: allergies, current medications, past family history, past medical history, past social history, past surgical history and problem list          Past Medical History:   Diagnosis Date    Abnormal Pap smear of cervix 02/17/2014    Arthritis     Asthma     Herpes genitalis     Hypothyroidism     IBS (irritable bowel syndrome)     Irritable bowel syndrome     Migraine     Pneumonia        Past Surgical History:   Procedure Laterality Date    CERVICAL CONE BIOPSY      cervical conization by cold knife    CHOLECYSTECTOMY      OVARIAN CYST REMOVAL      NC CONIZATION CERVIX,KNIFE/LASER  03/27/2014    SHOULDER SURGERY      TUBAL LIGATION Family History   Problem Relation Age of Onset    No Known Problems Father     Breast cancer Other        Social History     Socioeconomic History    Marital status: /Civil Union     Spouse name: Not on file    Number of children: Not on file    Years of education: Not on file    Highest education level: Not on file   Occupational History    Occupation: Retired   Tobacco Use    Smoking status: Former Smoker     Packs/day: 0 50     Years: 10 00     Pack years: 5 00     Types: Cigarettes     Quit date: 1980     Years since quittin 3    Smokeless tobacco: Never Used   Vaping Use    Vaping Use: Never used   Substance and Sexual Activity    Alcohol use:  Yes     Alcohol/week: 0 0 standard drinks     Comment: Glass of wine occasionally    Drug use: No    Sexual activity: Yes     Partners: Male     Birth control/protection: Post-menopausal   Other Topics Concern    Not on file   Social History Narrative    Feels safe at home     Social Determinants of Health     Financial Resource Strain: Not on file   Food Insecurity: Not on file   Transportation Needs: Not on file   Physical Activity: Not on file   Stress: Not on file   Social Connections: Not on file   Intimate Partner Violence: Not on file   Housing Stability: Not on file         Current Outpatient Medications:     conjugated estrogens (PREMARIN) vaginal cream, Insert 0 5 g into the vagina once a week, Disp: 30 g, Rfl: 1    levothyroxine 88 mcg tablet, , Disp: , Rfl:     montelukast (SINGULAIR) 10 mg tablet, Take 10 mg by mouth, Disp: , Rfl:     multivitamin (THERAGRAN) TABS, Take 1 tablet by mouth, Disp: , Rfl:     venlafaxine (EFFEXOR-XR) 37 5 mg 24 hr capsule, Take by mouth, Disp: , Rfl:     ZOVIRAX 5 % ointment, Apply topically 6 (six) times a day for 7 days, Disp: 30 g, Rfl: 0    Allergies   Allergen Reactions    Dust Mite Extract     Other     Topiramate Other (See Comments)     paresthesias, edema of lips, tongue Objective   Vitals:    04/27/22 1145   BP: 116/68   BP Location: Left arm   Patient Position: Sitting   Cuff Size: Standard   Weight: 60 kg (132 lb 3 2 oz)   Height: 5' 4" (1 626 m)     Physical Exam  Vitals reviewed  Constitutional:       General: She is awake  She is not in acute distress  Appearance: Normal appearance  She is well-developed, well-groomed and normal weight  She is not ill-appearing, toxic-appearing or diaphoretic  HENT:      Head: Normocephalic and atraumatic  Eyes:      Conjunctiva/sclera: Conjunctivae normal    Abdominal:      General: There is no distension  Palpations: Abdomen is soft  There is no hepatomegaly, splenomegaly or mass  Tenderness: There is no abdominal tenderness  Hernia: No hernia is present  There is no hernia in the left inguinal area or right inguinal area  Genitourinary:     General: Normal vulva  Exam position: Supine  Pubic Area: No rash or pubic lice  Labia:         Right: No rash, tenderness, lesion or injury  Left: No rash, tenderness, lesion or injury  Urethra: No prolapse, urethral pain, urethral swelling or urethral lesion  Vagina: Normal  No signs of injury and foreign body  No vaginal discharge, erythema, tenderness, bleeding, lesions or prolapsed vaginal walls  Cervix: No cervical motion tenderness, discharge, friability, lesion, erythema or cervical bleeding  Uterus: Not deviated, not enlarged, not fixed, not tender and no uterine prolapse  Adnexa:         Right: No mass, tenderness or fullness  Left: No mass, tenderness or fullness  Comments: Mild vaginal atrophy noted; no rashes lesions or ulcerations noted on vulva; very small amount of thin white vaginal discharge appreciated  Lymphadenopathy:      Lower Body: No right inguinal adenopathy  No left inguinal adenopathy  Skin:     General: Skin is warm and dry     Neurological:      Mental Status: She is alert and oriented to person, place, and time  Psychiatric:         Mood and Affect: Mood and affect normal          Speech: Speech normal          Behavior: Behavior normal  Behavior is cooperative  Thought Content: Thought content normal          Judgment: Judgment normal          Patient Instructions   Encourage probiotic tablet like acidophilus twice a day with symptoms and then once daily for maintenance; can also increase yogurt in diet; Apply OTC hydrocortisone twice a day as needed for vulvar itching  Can consider vaginal pH balance products like Luvena prebiotic/probiotic vaginal moisturizer or products on [employer]  com offer more natural/plant based products  Ok to continue premarin vaginal cream q 4 days and on the off days can consider coconut oil or any of the vaginal moisturizers/pH balancers you may choose to try

## 2022-04-27 NOTE — ASSESSMENT & PLAN NOTE
Reassured patient with unremarkable physical exam and wet mount today  Genital culture obtained - will treat based on results  Encourage probiotic tablet like acidophilus BID with sx and then qd for maintenance; can also increase yogurt in diet; Apply OTC hydrocortisone BID x 7-10 days for vulvar itching  Can consider vaginal pH balance products and some suggestions listed in her AVS  Reviewed her management of vaginal dryness/atrophy - ok to continue premarin vaginal cream q 4 days and on the off days can consider coconut oil or any of the vaginal moisturizers/pH balancers  All questions answered at this time  Patient to call with any further questions/concerns    RTO for APE when due or sooner if needed

## 2022-04-27 NOTE — PATIENT INSTRUCTIONS
Encourage probiotic tablet like acidophilus twice a day with symptoms and then once daily for maintenance; can also increase yogurt in diet; Apply OTC hydrocortisone twice a day as needed for vulvar itching  Can consider vaginal pH balance products like Luvena prebiotic/probiotic vaginal moisturizer or products on [employer]  com offer more natural/plant based products  Ok to continue premarin vaginal cream q 4 days and on the off days can consider coconut oil or any of the vaginal moisturizers/pH balancers you may choose to try

## 2022-05-01 LAB
BACTERIA GENITAL AEROBE CULT: ABNORMAL
BACTERIA GENITAL AEROBE CULT: ABNORMAL

## 2022-05-02 DIAGNOSIS — N76.0 BACTERIAL VAGINITIS: Primary | ICD-10-CM

## 2022-05-02 DIAGNOSIS — B96.89 BACTERIAL VAGINITIS: Primary | ICD-10-CM

## 2022-05-02 DIAGNOSIS — A49.8 E. COLI INFECTION: ICD-10-CM

## 2022-05-02 RX ORDER — AMOXICILLIN 500 MG/1
500 TABLET, FILM COATED ORAL 2 TIMES DAILY
Qty: 14 TABLET | Refills: 0 | Status: SHIPPED | OUTPATIENT
Start: 2022-05-02 | End: 2022-05-09

## 2022-10-12 PROBLEM — R05.9 COUGH: Status: RESOLVED | Noted: 2020-12-08 | Resolved: 2022-10-12

## 2022-10-12 PROBLEM — Z01.419 ROUTINE GYNECOLOGICAL EXAMINATION: Status: RESOLVED | Noted: 2021-09-15 | Resolved: 2022-10-12

## 2022-11-30 ENCOUNTER — TELEPHONE (OUTPATIENT)
Dept: OBGYN CLINIC | Facility: CLINIC | Age: 72
End: 2022-11-30

## 2022-11-30 DIAGNOSIS — N95.2 ATROPHIC VAGINITIS: Primary | ICD-10-CM

## 2022-11-30 RX ORDER — CONJUGATED ESTROGENS 0.62 MG/G
0.5 CREAM VAGINAL WEEKLY
Qty: 30 G | Refills: 0 | Status: SHIPPED | OUTPATIENT
Start: 2022-11-30 | End: 2024-01-24

## 2022-12-08 DIAGNOSIS — N95.2 ATROPHIC VAGINITIS: ICD-10-CM

## 2022-12-08 RX ORDER — CONJUGATED ESTROGENS 0.62 MG/G
0.5 CREAM VAGINAL WEEKLY
Qty: 30 G | Refills: 0 | Status: CANCELLED | OUTPATIENT
Start: 2022-12-08 | End: 2024-02-01

## 2023-01-23 NOTE — PROGRESS NOTES
Assessment/Plan:       Problem List Items Addressed This Visit    None  Visit Diagnoses     Vaginal irritation    -  Primary    Relevant Orders    Genital Comprehensive Culture          Reviewed unremarkable physical exam findings with the patient  Discussed plan of care with the patient including waiting for culture to come back prior to treatment  Discussed utilizing coconut oil for vaginal irritation  Subjective:      Patient ID: Sancho Taveras is a 71 y o  female  Patient presents to the office today for vaginal irritation  She reports vaginal discharge for approximately 1 month  She had symptoms like this previously in 04/2022 which resulted in an Ecoli infection  She reports occasional vaginal itching, but denies odor  She reports thick, clear vaginal discharge  She has not tried home treatments  She denies urinary symptoms of burning, urgency, frequency, and flank pain  She denies use of new soaps/detergents/lifestyle changes  The following portions of the patient's history were reviewed and updated as appropriate:   She  has a past medical history of Abnormal Pap smear of cervix (02/17/2014), Arthritis, Asthma, Herpes genitalis, Hypothyroidism, IBS (irritable bowel syndrome), Irritable bowel syndrome, Migraine, and Pneumonia    She   Patient Active Problem List    Diagnosis Date Noted   • Allergic rhinitis 12/08/2020   • Allergic rhinitis due to animal hair and dander 12/08/2020   • Allergic rhinitis due to pollen 12/08/2020   • History of pneumonia 12/08/2020   • Mild intermittent asthma 12/08/2020   • Bacterial vaginosis 11/17/2020   • Restless legs syndrome (RLS) 10/22/2018   • Vaginal discharge 01/12/2018   • Vaginal itching 01/12/2018   • Menopausal vaginal dryness 09/07/2017   • Post menopausal problems 09/07/2017   • Intractable chronic migraine without aura and without status migrainosus 04/06/2017   • Gastroesophageal reflux disease without esophagitis 08/02/2016   • Irritable bowel syndrome with diarrhea 08/02/2016   • Mild persistent asthma without complication 70/71/0770   • Other specified hypothyroidism 08/02/2016   • Senile osteoporosis 08/02/2016   • Asthma 04/25/2016   • Neck pain 09/04/2008     She  has a past surgical history that includes Cervical cone biopsy; Cholecystectomy; Ovarian cyst removal; Shoulder surgery; Tubal ligation; and pr conization cervix w/wo d&c rpr knife/laser (03/27/2014)  Her family history includes Breast cancer in her other; No Known Problems in her father  She  reports that she quit smoking about 43 years ago  Her smoking use included cigarettes  She has a 5 00 pack-year smoking history  She has never used smokeless tobacco  She reports current alcohol use  She reports that she does not use drugs  Current Outpatient Medications   Medication Sig Dispense Refill   • estrogens, conjugated (Premarin) vaginal cream Insert 0 5 g into the vagina once a week 30 g 0   • levothyroxine 88 mcg tablet      • montelukast (SINGULAIR) 10 mg tablet Take 10 mg by mouth     • multivitamin (THERAGRAN) TABS Take 1 tablet by mouth     • venlafaxine (EFFEXOR-XR) 37 5 mg 24 hr capsule Take by mouth     • ZOVIRAX 5 % ointment Apply topically 6 (six) times a day for 7 days 30 g 0     No current facility-administered medications for this visit  Current Outpatient Medications on File Prior to Visit   Medication Sig   • estrogens, conjugated (Premarin) vaginal cream Insert 0 5 g into the vagina once a week   • levothyroxine 88 mcg tablet    • montelukast (SINGULAIR) 10 mg tablet Take 10 mg by mouth   • multivitamin (THERAGRAN) TABS Take 1 tablet by mouth   • venlafaxine (EFFEXOR-XR) 37 5 mg 24 hr capsule Take by mouth   • ZOVIRAX 5 % ointment Apply topically 6 (six) times a day for 7 days     No current facility-administered medications on file prior to visit  She is allergic to dust mite extract, other, and topiramate       Review of Systems   Constitutional: Negative for chills, fatigue and fever  Gastrointestinal: Negative for abdominal pain  Genitourinary: Positive for vaginal discharge  Negative for dysuria, flank pain, frequency, hematuria, pelvic pain, urgency, vaginal bleeding and vaginal pain  Skin: Negative for rash  Objective:      /66   Ht 5' 4" (1 626 m)   Wt 60 kg (132 lb 3 2 oz)   LMP  (LMP Unknown)   BMI 22 69 kg/m²          Physical Exam  Vitals and nursing note reviewed  Constitutional:       Appearance: Normal appearance  HENT:      Head: Normocephalic  Eyes:      Conjunctiva/sclera: Conjunctivae normal    Cardiovascular:      Rate and Rhythm: Normal rate and regular rhythm  Heart sounds: Normal heart sounds  Pulmonary:      Effort: Pulmonary effort is normal  No respiratory distress  Breath sounds: Normal breath sounds  Abdominal:      General: Abdomen is flat  Palpations: Abdomen is soft  Tenderness: There is no right CVA tenderness or left CVA tenderness  Genitourinary:     General: Normal vulva  Exam position: Lithotomy position  Pubic Area: No pubic lice  Labia:         Right: No rash or tenderness  Left: No rash or tenderness  Urethra: No urethral pain or urethral swelling  Vagina: Normal  No vaginal discharge  Cervix: Normal       Uterus: Normal        Adnexa: Right adnexa normal and left adnexa normal       Comments: Mild vulvar atrophy noted on examination  Musculoskeletal:         General: Normal range of motion  Cervical back: Normal range of motion  Lymphadenopathy:      Lower Body: No right inguinal adenopathy  No left inguinal adenopathy  Skin:     General: Skin is warm and dry  Neurological:      Mental Status: She is alert and oriented to person, place, and time  Psychiatric:         Mood and Affect: Mood normal          Behavior: Behavior normal          Thought Content:  Thought content normal          Judgment: Judgment normal

## 2023-01-24 ENCOUNTER — OFFICE VISIT (OUTPATIENT)
Dept: OBGYN CLINIC | Facility: CLINIC | Age: 73
End: 2023-01-24

## 2023-01-24 VITALS
SYSTOLIC BLOOD PRESSURE: 112 MMHG | DIASTOLIC BLOOD PRESSURE: 66 MMHG | HEIGHT: 64 IN | BODY MASS INDEX: 22.57 KG/M2 | WEIGHT: 132.2 LBS

## 2023-01-24 DIAGNOSIS — N89.8 VAGINAL IRRITATION: Primary | ICD-10-CM

## 2023-01-28 LAB — BACTERIA GENITAL AEROBE CULT: NORMAL

## 2023-12-04 ENCOUNTER — ANNUAL EXAM (OUTPATIENT)
Dept: OBGYN CLINIC | Facility: CLINIC | Age: 73
End: 2023-12-04
Payer: MEDICARE

## 2023-12-04 VITALS
SYSTOLIC BLOOD PRESSURE: 114 MMHG | DIASTOLIC BLOOD PRESSURE: 66 MMHG | WEIGHT: 134.4 LBS | HEIGHT: 64 IN | BODY MASS INDEX: 22.94 KG/M2

## 2023-12-04 DIAGNOSIS — Z01.419 ENCOUNTER FOR ANNUAL ROUTINE GYNECOLOGICAL EXAMINATION: ICD-10-CM

## 2023-12-04 DIAGNOSIS — Z12.31 ENCOUNTER FOR SCREENING MAMMOGRAM FOR BREAST CANCER: Primary | ICD-10-CM

## 2023-12-04 PROCEDURE — G0145 SCR C/V CYTO,THINLAYER,RESCR: HCPCS | Performed by: OBSTETRICS & GYNECOLOGY

## 2023-12-04 PROCEDURE — G0101 CA SCREEN;PELVIC/BREAST EXAM: HCPCS | Performed by: OBSTETRICS & GYNECOLOGY

## 2023-12-04 PROCEDURE — G0476 HPV COMBO ASSAY CA SCREEN: HCPCS | Performed by: OBSTETRICS & GYNECOLOGY

## 2023-12-04 RX ORDER — METOCLOPRAMIDE 10 MG/1
TABLET ORAL
COMMUNITY
Start: 2023-11-17

## 2023-12-04 NOTE — PROGRESS NOTES
Subjective      Ro Velasco is a 68 y.o. female who presents for annual well woman exam.     GYN:  She denies any vaginal bleeding. She reports vaginal discharge that has been persistent and she had had multiple workups for it. She denies erythema, itching or burning associated. She has not been using the premarin cream as she found out when she accidentally discontinued the cream that her discharge improved. She denies labial erythema or lesions, dyspareunia. Contraception: N/A. Patient is sexually active with her . OB:  OB History    Para Term  AB Living   1       1     SAB IAB Ectopic Multiple Live Births                  # Outcome Date GA Lbr Tyrell/2nd Weight Sex Delivery Anes PTL Lv   1 AB               Obstetric Comments    - 1 AB     :  She denies dysuria, urinary frequency or urgency. She denies hematuria, flank pain, incontinence. Breast:  She denies breast mass, skin changes, dimpling, reddening, nipple retraction. She denies breast discharge. Patient does not have a family history of colon, endometrial, or ovarian ca. Cancer-related family history includes Breast cancer in her other.     Past Medical History:   Diagnosis Date    Abnormal Pap smear of cervix 2014    Arthritis     Asthma     Herpes genitalis     Hypothyroidism     IBS (irritable bowel syndrome)     Irritable bowel syndrome     Migraine     Pneumonia      Past Surgical History:   Procedure Laterality Date    CERVICAL CONE BIOPSY      cervical conization by cold knife    CHOLECYSTECTOMY      OVARIAN CYST REMOVAL      KS CONIZATION CERVIX W/WO D&C RPR KNIFE/LASER  2014    SHOULDER SURGERY      TUBAL LIGATION       General:  Diet: Improving; working on cholesterol  Exercise: treadmill     Social History     Tobacco Use    Smoking status: Former     Packs/day: 0.50     Years: 10.00     Total pack years: 5.00     Types: Cigarettes     Quit date: 1980     Years since quittin.9 Smokeless tobacco: Never   Vaping Use    Vaping Use: Never used   Substance Use Topics    Alcohol use: Yes     Comment: Glass of wine occasionally    Drug use: No     Screening:  Cervical cancer: last pap smear in 09/15/2021. Results were NILM. Patient has hx CKC in 2014 that showed ROMULO 2. Pap tests in 2016, 2017, 2018, 2019, and 2021 all NILM/ HPV neg (f tested). Patient has requested pap test today. Breast cancer: last mammogram in 10/02/2023. Results were Birads 1. Colon cancer: last colonoscopy in 10/02/2020. Results were significant for polyp and diverticulosis. STD screening: None. Review of Systems   Constitutional:  Negative for appetite change, chills and fever. HENT:  Negative for trouble swallowing. Respiratory:  Negative for shortness of breath. Cardiovascular:  Negative for chest pain. Gastrointestinal:  Positive for constipation (Has IBS) and diarrhea (Has IBS). Negative for abdominal pain, nausea and vomiting. Genitourinary:  Negative for decreased urine volume, difficulty urinating, dyspareunia, dysuria, flank pain, frequency, genital sores, hematuria, menstrual problem, pelvic pain, urgency, vaginal bleeding, vaginal discharge and vaginal pain. Objective      /66 (BP Location: Left arm, Patient Position: Sitting, Cuff Size: Standard)   Ht 5' 4" (1.626 m)   Wt 61 kg (134 lb 6.4 oz)   LMP  (LMP Unknown)   BMI 23.07 kg/m²   Physical Exam  Vitals reviewed. Constitutional:       General: She is not in acute distress. Appearance: Normal appearance. She is well-developed. She is not ill-appearing, toxic-appearing or diaphoretic. Neck:      Thyroid: No thyroid mass, thyromegaly or thyroid tenderness. Cardiovascular:      Rate and Rhythm: Normal rate and regular rhythm. Heart sounds: Normal heart sounds. No murmur heard. No friction rub. No gallop. Pulmonary:      Effort: Pulmonary effort is normal. No respiratory distress.       Breath sounds: Normal breath sounds. No stridor. No wheezing, rhonchi or rales. Chest:      Chest wall: No tenderness. Breasts:     Breasts are symmetrical.      Right: No swelling, bleeding, inverted nipple, mass, nipple discharge, skin change or tenderness. Left: No swelling, bleeding, inverted nipple, mass, nipple discharge, skin change or tenderness. Abdominal:      General: There is no distension. Palpations: Abdomen is soft. Tenderness: There is no abdominal tenderness. Genitourinary:     General: Normal vulva. Exam position: Lithotomy position. Labia:         Right: No rash, tenderness, lesion or injury. Left: No rash, tenderness, lesion or injury. Urethra: No prolapse or urethral lesion. Vagina: Normal. No signs of injury and foreign body. No vaginal discharge, erythema, tenderness, bleeding, lesions or prolapsed vaginal walls. Cervix: No cervical motion tenderness, discharge, friability, lesion, erythema, cervical bleeding or eversion. Uterus: Not deviated, not enlarged, not fixed, not tender and no uterine prolapse. Adnexa:         Right: No mass, tenderness or fullness. Left: No mass, tenderness or fullness. Rectum: No external hemorrhoid. Comments: Truncated cervix with small os  Lymphadenopathy:      Cervical: No cervical adenopathy. Upper Body:      Right upper body: No supraclavicular, axillary or pectoral adenopathy. Left upper body: No supraclavicular, axillary or pectoral adenopathy. Skin:     General: Skin is warm and dry. Neurological:      Mental Status: She is alert and oriented to person, place, and time. Psychiatric:         Behavior: Behavior normal. Behavior is cooperative.                  Assessment/Plan  Problem List Items Addressed This Visit          Unprioritized    Encounter for annual routine gynecological examination     No GYN concerns today  Birth control: N/A  Cervical cancer screening: Completed due to history of ROMULO 2 on CKC  Breast Cancer screening: Due 10/2024; ordered   Colon cancer Screening: Due 10/2025  STD screening: Declined  Reviewed healthy lifestyle and safe sex practices  RTO for annual exam or PRN          Other Visit Diagnoses       Encounter for screening mammogram for breast cancer    -  Primary    Relevant Orders    Mammo screening bilateral w 3d & cad                  Linda Laureano MD  OB/GYN  12/4/2023  11:49 AM

## 2023-12-04 NOTE — ASSESSMENT & PLAN NOTE
No GYN concerns today  Birth control: N/A  Cervical cancer screening: Completed due to history of ROMULO 2 on CKC  Breast Cancer screening: Due 10/2024; ordered   Colon cancer Screening: Due 10/2025  STD screening: Declined  Reviewed healthy lifestyle and safe sex practices  RTO for annual exam or PRN

## 2023-12-05 LAB
HPV HR 12 DNA CVX QL NAA+PROBE: NEGATIVE
HPV16 DNA CVX QL NAA+PROBE: NEGATIVE
HPV18 DNA CVX QL NAA+PROBE: NEGATIVE

## 2023-12-07 LAB
LAB AP GYN PRIMARY INTERPRETATION: NORMAL
Lab: NORMAL

## 2023-12-11 NOTE — RESULT ENCOUNTER NOTE
Pt w/ history of CKC 2014. She has had normal pap tests:  2016, 2017, 2018, 2019, 2021, and now 2023. Pap NILM/ HPV neg. Repeat pap 3 years per [facility] guidelines: "HPV based testing annual x3 then if necessary, HPV-based testing every 3 years for at least 25 years. "    Edgar Umaña.  Ernst Carter MD  OB/GYN  12/11/2023  12:47 PM